# Patient Record
Sex: MALE | Race: WHITE | Employment: FULL TIME | URBAN - METROPOLITAN AREA
[De-identification: names, ages, dates, MRNs, and addresses within clinical notes are randomized per-mention and may not be internally consistent; named-entity substitution may affect disease eponyms.]

---

## 2020-07-23 ENCOUNTER — HOSPITAL ENCOUNTER (EMERGENCY)
Age: 60
Discharge: HOME | End: 2020-07-23
Attending: EMERGENCY MEDICINE
Payer: MEDICAID

## 2020-07-23 VITALS
RESPIRATION RATE: 18 BRPM | BODY MASS INDEX: 31.81 KG/M2 | DIASTOLIC BLOOD PRESSURE: 100 MMHG | WEIGHT: 240 LBS | SYSTOLIC BLOOD PRESSURE: 162 MMHG | HEART RATE: 78 BPM | TEMPERATURE: 97 F | HEIGHT: 73 IN | OXYGEN SATURATION: 95 %

## 2020-07-23 DIAGNOSIS — H65.91 RIGHT OTITIS MEDIA WITH EFFUSION: ICD-10-CM

## 2020-07-23 DIAGNOSIS — H60.501 ACUTE OTITIS EXTERNA OF RIGHT EAR, UNSPECIFIED TYPE: Primary | ICD-10-CM

## 2020-07-23 PROCEDURE — 99283 EMERGENCY DEPT VISIT LOW MDM: CPT

## 2020-07-23 PROCEDURE — 74011250637 HC RX REV CODE- 250/637: Performed by: EMERGENCY MEDICINE

## 2020-07-23 RX ORDER — ACETAMINOPHEN 500 MG
1000 TABLET ORAL
Status: COMPLETED | OUTPATIENT
Start: 2020-07-23 | End: 2020-07-23

## 2020-07-23 RX ORDER — AMOXICILLIN 875 MG/1
875 TABLET, FILM COATED ORAL 2 TIMES DAILY
Qty: 20 TAB | Refills: 0 | Status: SHIPPED | OUTPATIENT
Start: 2020-07-23 | End: 2020-08-02

## 2020-07-23 RX ORDER — NIFEDIPINE 30 MG/1
30 TABLET, EXTENDED RELEASE ORAL DAILY
Qty: 15 TAB | Refills: 0 | Status: SHIPPED | OUTPATIENT
Start: 2020-07-23 | End: 2020-08-07

## 2020-07-23 RX ORDER — NIFEDIPINE 10 MG/1
10 CAPSULE ORAL
Status: COMPLETED | OUTPATIENT
Start: 2020-07-23 | End: 2020-07-23

## 2020-07-23 RX ORDER — CIPROFLOXACIN AND DEXAMETHASONE 3; 1 MG/ML; MG/ML
4 SUSPENSION/ DROPS AURICULAR (OTIC) 2 TIMES DAILY
Qty: 7.5 ML | Refills: 0 | Status: SHIPPED | OUTPATIENT
Start: 2020-07-23 | End: 2021-03-31

## 2020-07-23 RX ADMIN — NIFEDIPINE 10 MG: 10 CAPSULE ORAL at 10:31

## 2020-07-23 RX ADMIN — ACETAMINOPHEN 1000 MG: 500 TABLET, FILM COATED ORAL at 10:31

## 2020-07-23 NOTE — DISCHARGE INSTRUCTIONS
Patient Education        Swimmer's Ear: Care Instructions  Your Care Instructions     Swimmer's ear (otitis externa) is inflammation or infection of the ear canal. This is the passage that leads from the outer ear to the eardrum. Any water, sand, or other debris that gets into the ear canal and stays there can cause swimmer's ear. Putting cotton swabs or other items in the ear to clean it can also cause this problem. Swimmer's ear can be very painful. But you can treat the pain and infection with medicines. You should feel better in a few days. Follow-up care is a key part of your treatment and safety. Be sure to make and go to all appointments, and call your doctor if you are having problems. It's also a good idea to know your test results and keep a list of the medicines you take. How can you care for yourself at home? Cleaning and care  · Use antibiotic drops as your doctor directs. · Do not insert ear drops (other than the antibiotic ear drops) or anything else into the ear unless your doctor has told you to. · Avoid getting water in the ear until the problem clears up. Use cotton lightly coated with petroleum jelly as an earplug. Do not use plastic earplugs. · Use a hair dryer set on low to carefully dry the ear after you shower. · To ease ear pain, hold a warm washcloth against your ear. · Take pain medicines exactly as directed. ? If the doctor gave you a prescription medicine for pain, take it as prescribed. ? If you are not taking a prescription pain medicine, ask your doctor if you can take an over-the-counter medicine. Inserting ear drops  · Warm the drops to body temperature by rolling the container in your hands. Or you can place it in a cup of warm water for a few minutes. · Lie down, with your ear facing up. · Place drops inside the ear. Follow your doctor's instructions (or the directions on the label) for how many drops to use.  Gently wiggle the outer ear or pull the ear up and back to help the drops get into the ear. · It's important to keep the liquid in the ear canal for 3 to 5 minutes. When should you call for help? Call your doctor now or seek immediate medical care if:  · You have a new or higher fever. · You have new or worse pain, swelling, warmth, or redness around or behind your ear. · You have new or increasing pus or blood draining from your ear. Watch closely for changes in your health, and be sure to contact your doctor if:  · You are not getting better after 2 days (48 hours). Where can you learn more? Go to http://www.gray.com/  Enter C706 in the search box to learn more about \"Swimmer's Ear: Care Instructions. \"  Current as of: July 29, 2019               Content Version: 12.5  © 8983-6698 [employer]. Care instructions adapted under license by [employer] (which disclaims liability or warranty for this information). If you have questions about a medical condition or this instruction, always ask your healthcare professional. [place] [address] any warranty or liability for your use of this information. Patient Education        Keeping Ears Dry: Care Instructions  Your Care Instructions  Your doctor wants you to keep water from getting into your ears. You may need to do this because of a ruptured eardrum, an ear infection, or other ear problems. Follow-up care is a key part of your treatment and safety. Be sure to make and go to all appointments, and call your doctor if you are having problems. It's also a good idea to know your test results and keep a list of the medicines you take. How can you care for yourself at home? · Take baths until your doctor says you can take showers again. Avoid getting water in the ear until after the problem clears up. Ask your doctor if you should use earplugs to keep water out of your ears. · Do not swim until your doctor says you can.   · If you get water in your ears, turn your head to each side and pull the earlobe in different directions. This will help the water run out. If your ears are still wet, use a hair dryer set on the lowest heat. Hold the dryer several inches from your ear. · Use your medicines exactly as prescribed. Call your doctor if you think you are having a problem with your medicine. Do not put drops in your ears unless your doctor prescribes them. When should you call for help? Watch closely for changes in your health, and be sure to contact your doctor if you have any problems. Where can you learn more? Go to http://www.gray.com/  Enter Z972 in the search box to learn more about \"Keeping Ears Dry: Care Instructions. \"  Current as of: July 29, 2019               Content Version: 12.5  © 4463-2696 [employer]. Care instructions adapted under license by [employer] (which disclaims liability or warranty for this information). If you have questions about a medical condition or this instruction, always ask your healthcare professional. [address] any warranty or liability for your use of this information.

## 2020-07-23 NOTE — ED TRIAGE NOTES
Right ear pain after swimming x 2 days. Right elbow bursa swollen. tx for same with antibx, steroid and anti inflammatory 3 weeks ago at urgent care.

## 2020-07-23 NOTE — ED PROVIDER NOTES
Date: 7/23/2020  Patient Name: Donny Higuera    History of Presenting Illness     Chief Complaint   Patient presents with    Ear Pain    Elbow Pain       History Provided By: Patient      HPI/Chief Complaint: (Context):who presents with patient went swimming 2 days ago  States has been having right ear pain no drainage  Painful to touch  No hearing loss no ringing in the ear no foreign body no headache no blurry vision no chest pain or shortness of breath no abdominal pain no cough congestion upper respiratory symptoms no coronavirus exposure no exertional chest pain or shortness of breath no calf pain no abdominal pain no black or bloody stool    Patient states he has also right elbow swelling which is much improved since he got treatment he is still treating it and antibiotics are finished  There is no new complaint from the right elbow he states he does not need anything for it currently just wanted to mention it. Patient does state that he has history of hypertension he has not taken medications. She is diet controlled  He was not clear that his blood pressure was significantly elevated  Patient does not member the medication he took at that point. ---  Patient's past medical history is for hypertension and seizure  Patient's chief complaint is ear pain and elbow pain  Patient's blood pressure is 201/125 rest of vitals are stable  Patient's triage note is reviewed  Patient's social history is current smoker no alcohol no drugs      PCP: None        Past History     Past Medical History:  Past Medical History:   Diagnosis Date    Hypertension     Seizures ([address]     20 years ago       Past Surgical History:  History reviewed. No pertinent surgical history. Family History:  History reviewed. No pertinent family history.     Social History:  Social History     Tobacco Use    Smoking status: Current Every Day Smoker    Smokeless tobacco: Never Used   Substance Use Topics    Alcohol use: Not on file    Drug use: Not on file       Allergies:  No Known Allergies      Review of Systems   Review of Systems   Constitutional: Negative for activity change, fatigue and fever. HENT: Positive for ear pain. Negative for congestion and rhinorrhea. Eyes: Negative for visual disturbance. Respiratory: Negative for shortness of breath. Cardiovascular: Negative for chest pain and palpitations. Gastrointestinal: Negative for abdominal pain, diarrhea, nausea and vomiting. Genitourinary: Negative for dysuria and hematuria. Musculoskeletal: Negative for back pain and joint swelling. Skin: Negative for rash. Neurological: Negative for dizziness, weakness and light-headedness. Psychiatric/Behavioral: Negative for agitation. All other systems reviewed and are negative. Physical Exam     Physical Exam  Constitutional:       Appearance: He is well-developed. HENT:      Head: Normocephalic and atraumatic. Jaw: There is normal jaw occlusion. Right Ear: Hearing normal. Swelling present. A middle ear effusion is present. Left Ear: Hearing, tympanic membrane, ear canal and external ear normal.      Ears:      Comments: Mild tenderness on the palpation of the ear in the canal  Positive erythema,  Patient with TM that with congestion, mild erythema as well no tympanic membrane perforation appreciated  Above exam is for right eardrum and ear canal  Eyes:      Conjunctiva/sclera: Conjunctivae normal.      Pupils: Pupils are equal, round, and reactive to light. Neck:      Musculoskeletal: Normal range of motion and neck supple. Cardiovascular:      Rate and Rhythm: Normal rate and regular rhythm. Pulmonary:      Effort: Pulmonary effort is normal.      Breath sounds: Normal breath sounds. Abdominal:      General: Bowel sounds are normal.      Palpations: Abdomen is soft. Musculoskeletal: Normal range of motion. Lymphadenopathy:      Cervical: No cervical adenopathy.    Skin: General: Skin is warm. Neurological:      Mental Status: He is alert. Medical Decision Making   I am the first provider for this patient. I reviewed the vital signs, available nursing notes, past medical history, past surgical history, family history and social history. Provider Notes (Medical Decision Making): Right otitis media versus externa  No obvious drainage  Canal is irritated  TM is with congestion as well I will start treatment for otitis media and externa  Patient with no TM perforation    Patient with right elbow swelling in the olecranon process  No joint swelling full range of motion  No sign of erythema nontender area patient says the swelling is much improved    Patient's blood pressure significantly elevated which I cannot start patient on Procardia at this point considering patient has not had any medication recently patient's history of hypertension  Patient will need a close follow-up as well    Vital Signs-Reviewed the patient's vital signs. Pulse Oximetry Analysis -98%, room air, normal       Vitals:    07/23/20 0924 07/23/20 1032   BP: (!) 201/125 (!) 162/100   Pulse: 86 78   Resp: 16 18   Temp: 97 °F (36.1 °C)    SpO2: 98% 95%   Weight: 108.9 kg (240 lb)    Height: 6' 1\" (1.854 m)        Records Reviewed: Nursing Notes    ED Course:        Discharge Note:  10:07 AM  The pt is ready for discharge. The pt's signs, symptoms, diagnosis, and discharge instructions have been discussed and pt has conveyed their understanding. The pt is to follow up as recommended or return to ER should their symptoms worsen. Plan has been discussed and pt is in agreement. Subjective:   Nisa Higuera is a 61 y.o. male with hypertension.      Hypertension ROS: patient does not perform home BP monitoring, no TIA's, no chest pain on exertion, no dyspnea on exertion, no swelling of ankles, no orthostatic dizziness or lightheadedness, no orthopnea or paroxysmal nocturnal dyspnea, no palpitations, no muscle aches or pain. Objective:   Visit Vitals  BP (!) 162/100   Pulse 78   Temp 97 °F (36.1 °C)   Resp 18   Ht 6' 1\" (1.854 m)   Wt 108.9 kg (240 lb)   SpO2 95%   BMI 31.66 kg/m²      Appearance alert, well appearing, and in no distress, oriented to person, place, and time, normal appearing weight and well hydrated. General exam S1, S2 normal, no gallop, no murmur, chest clear, no JVD, no HSM, no edema. Lab review: no lab studies available for review at time of visit. Assessment:    Hypertension asymptomatic, poorly controlled, needs further observation, needs improvement, needs to follow diet more regularly. Plan:   orders and follow up as documented in patient record  reviewed diet, exercise and weight control. Diagnostic Study Results     Orders Placed This Encounter    B/P     Standing Status:   Standing     Number of Occurrences:   1    NIFEdipine (PROCARDIA) capsule 10 mg    acetaminophen (TYLENOL) tablet 1,000 mg    amoxicillin (AMOXIL) 875 mg tablet     Sig: Take 1 Tab by mouth two (2) times a day for 10 days. Dispense:  20 Tab     Refill:  0    NIFEdipine ER (Procardia XL) 30 mg ER tablet     Sig: Take 1 Tab by mouth daily for 15 days. Dispense:  15 Tab     Refill:  0    ciprofloxacin-dexamethasone (Ciprodex) 0.3-0.1 % otic suspension     Sig: Administer 4 Drops in left ear two (2) times a day. Dispense:  7.5 mL     Refill:  0       Labs -   No results found for this or any previous visit (from the past 12 hour(s)). Radiologic Studies -   No orders to display     CT Results  (Last 48 hours)    None        CXR Results  (Last 48 hours)    None              Discharge     Clinical Impression:   1. Acute otitis externa of right ear, unspecified type    2.  Right otitis media with effusion        Disposition:  Home    It should be noted that I will be the provider of record for this patient  Danielle Galindo MD      Follow-up Information     Follow up With Specialties Details Why [address] [facility] EMERGENCY DEPT Emergency Medicine  If symptoms worsen [address] [facility] [address] [address] Call in 1 day Follow Up From Emergency Department [address]  [phone]          Discharge Medication List as of 7/23/2020 10:13 AM      START taking these medications    Details   amoxicillin (AMOXIL) 875 mg tablet Take 1 Tab by mouth two (2) times a day for 10 days. , Print, Disp-20 Tab,R-0      NIFEdipine ER (Procardia XL) 30 mg ER tablet Take 1 Tab by mouth daily for 15 days. , Print, Disp-15 Tab,R-0      ciprofloxacin-dexamethasone (Ciprodex) 0.3-0.1 % otic suspension Administer 4 Drops in left ear two (2) times a day., Print, Disp-7.5 mL,R-0

## 2021-03-31 ENCOUNTER — HOSPITAL ENCOUNTER (EMERGENCY)
Age: 61
Discharge: HOME | End: 2021-03-31
Attending: STUDENT IN AN ORGANIZED HEALTH CARE EDUCATION/TRAINING PROGRAM
Payer: MEDICAID

## 2021-03-31 VITALS
WEIGHT: 240 LBS | SYSTOLIC BLOOD PRESSURE: 152 MMHG | HEART RATE: 93 BPM | RESPIRATION RATE: 18 BRPM | DIASTOLIC BLOOD PRESSURE: 77 MMHG | HEIGHT: 73 IN | TEMPERATURE: 98.7 F | BODY MASS INDEX: 31.81 KG/M2 | OXYGEN SATURATION: 93 %

## 2021-03-31 DIAGNOSIS — R04.0 EPISTAXIS: Primary | ICD-10-CM

## 2021-03-31 PROCEDURE — 99282 EMERGENCY DEPT VISIT SF MDM: CPT

## 2021-03-31 PROCEDURE — 74011250637 HC RX REV CODE- 250/637: Performed by: STUDENT IN AN ORGANIZED HEALTH CARE EDUCATION/TRAINING PROGRAM

## 2021-03-31 RX ORDER — OXYMETAZOLINE HCL 0.05 %
2 SPRAY, NON-AEROSOL (ML) NASAL
Status: DISCONTINUED | OUTPATIENT
Start: 2021-03-31 | End: 2021-03-31 | Stop reason: HOSPADM

## 2021-03-31 RX ADMIN — OXYMETAZOLINE HCL 2 SPRAY: 0.05 SPRAY NASAL at 19:08

## 2021-03-31 NOTE — ED TRIAGE NOTES
Patient presents with epistaxis, onset 2 days, denies recent injury/trauma, states the bleeding has been in the morning and in the evening, no bleeding at this time

## 2021-03-31 NOTE — ED PROVIDER NOTES
61year-old male with PMHx significant for HTN for which he has not taken medication for 3 years because he failed to refill his prescription or see a primary doctor after it ran out presents to the ED with complaint of intermittent epistaxis from the left nostril over the past 2 days. He denies any pain, trauma to the nose, and does not take blood thinners. The bleeding has stopped spontaneously each time after packing the nose with tissues and applying manual pressure. He has no other complaints. He smokes cigarettes but denies drinking or recreational drug use. Family history reviewed and non-contributory. Past Medical History:   Diagnosis Date    Hypertension     Seizures ([facility] [address]     20 years ago       History reviewed. No pertinent surgical history. History reviewed. No pertinent family history.     Social History     Socioeconomic History    Marital status:      Spouse name: Not on file    Number of children: Not on file    Years of education: Not on file    Highest education level: Not on file   Occupational History    Not on file   Social Needs    Financial resource strain: Not on file    Food insecurity     Worry: Not on file     Inability: Not on file    Transportation needs     Medical: Not on file     Non-medical: Not on file   Tobacco Use    Smoking status: Current Every Day Smoker     Packs/day: 1.00    Smokeless tobacco: Never Used   Substance and Sexual Activity    Alcohol use: Not Currently    Drug use: Not Currently    Sexual activity: Not on file   Lifestyle    Physical activity     Days per week: Not on file     Minutes per session: Not on file    Stress: Not on file   Relationships    Social connections     Talks on phone: Not on file     Gets together: Not on file     Attends Anabaptist service: Not on file     Active member of club or organization: Not on file     Attends meetings of clubs or organizations: Not on file     Relationship status: Not on file  Intimate partner violence     Fear of current or ex partner: Not on file     Emotionally abused: Not on file     Physically abused: Not on file     Forced sexual activity: Not on file   Other Topics Concern    Not on file   Social History Narrative    Not on file         ALLERGIES: Patient has no known allergies. Review of Systems   Constitutional: Negative for activity change and appetite change. HENT: Positive for nosebleeds. Negative for drooling and facial swelling. Eyes: Negative for pain and discharge. Respiratory: Negative for apnea and choking. Cardiovascular: Negative for palpitations and leg swelling. Gastrointestinal: Negative for blood in stool and rectal pain. Endocrine: Negative for polydipsia and polyphagia. Genitourinary: Negative for genital sores and hematuria. Musculoskeletal: Negative for gait problem and neck stiffness. Skin: Negative for color change and rash. Allergic/Immunologic: Negative for environmental allergies. Neurological: Negative for tremors. Hematological: Negative for adenopathy. Psychiatric/Behavioral: Negative for agitation and behavioral problems. Vitals:    03/31/21 1823 03/31/21 1827   BP: (!) 171/91    Pulse: 93    Resp: 18    Temp:  98.7 °F (37.1 °C)   SpO2: 94%    Weight: 108.9 kg (240 lb)    Height: 6' 1\" (1.854 m)             Physical Exam  Vitals signs and nursing note reviewed. Constitutional:       Appearance: Normal appearance. HENT:      Head: Normocephalic and atraumatic. Nose: Nose normal.      Comments: No active bleeding, no nasal septal hematoma, no signs of trauma, no blood in the posterior oropharynx. Eyes:      Extraocular Movements: Extraocular movements intact. Pupils: Pupils are equal, round, and reactive to light. Cardiovascular:      Rate and Rhythm: Normal rate and regular rhythm. Heart sounds: Normal heart sounds. No murmur. No friction rub.    Pulmonary:      Effort: Pulmonary effort is normal.      Breath sounds: Normal breath sounds. Abdominal:      Palpations: Abdomen is soft. Musculoskeletal: Normal range of motion. Skin:     General: Skin is warm and dry. Capillary Refill: Capillary refill takes less than 2 seconds. Neurological:      General: No focal deficit present. Mental Status: He is alert and oriented to person, place, and time. Psychiatric:         Mood and Affect: Mood normal.          MDM  Number of Diagnoses or Management Options  Diagnosis management comments: 80-year-old male here with 2 days of intermittent left-sided epistaxis. He has no active bleeding at this time. He has no signs of trauma on physical exam and has normal vitals other than elevated blood pressure; I do not feel that laboratory work-up is indicated at this time. Will provide treatment with Afrin nasal spray. Patient instructed on avoidance of digital trauma and to use over-the-counter saline nasal spray multiple times throughout the day to ensure his nasal mucosa remains moist.  He is stable for discharge home and instructed to follow-up with a primary doctor as outpatient. Address and phone number for the family practice clinic associated with with this hospital placed on his discharge paperwork. Pt has been reexamined. Patient has no new complaints, changes, or physical findings. Care plan outlined and precautions discussed. Results were reviewed with the patient. All medications were reviewed with the patient; will d/c home with PMD f/u. All of pt's questions and concerns were addressed. Patient was instructed and agrees to follow up with PMD, as well as to return to the ED upon further deterioration. Patient is ready to go home. This note was dictated utilizing voice recognition software which may lead to typographical errors.  I apologize in advance if the situation occurs.  If questions arise please do not hesitate to contact me or call our department.     Chava Perez, DO           Procedures